# Patient Record
Sex: MALE | Race: WHITE | Employment: UNEMPLOYED | URBAN - METROPOLITAN AREA
[De-identification: names, ages, dates, MRNs, and addresses within clinical notes are randomized per-mention and may not be internally consistent; named-entity substitution may affect disease eponyms.]

---
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
## 2019-01-16 ENCOUNTER — OFFICE VISIT - HEALTHEAST (OUTPATIENT)
Dept: FAMILY MEDICINE | Facility: CLINIC | Age: 19
End: 2019-01-16

## 2019-01-16 DIAGNOSIS — R11.10 VOMITING AND DIARRHEA: ICD-10-CM

## 2019-01-16 DIAGNOSIS — R19.7 VOMITING AND DIARRHEA: ICD-10-CM

## 2019-07-09 ENCOUNTER — COMMUNICATION - HEALTHEAST (OUTPATIENT)
Dept: SCHEDULING | Facility: CLINIC | Age: 19
End: 2019-07-09

## 2019-07-12 ENCOUNTER — OFFICE VISIT - HEALTHEAST (OUTPATIENT)
Dept: FAMILY MEDICINE | Facility: CLINIC | Age: 19
End: 2019-07-12

## 2019-07-12 DIAGNOSIS — R07.89 ATYPICAL CHEST PAIN: ICD-10-CM

## 2019-07-12 LAB
ATRIAL RATE - MUSE: 75 BPM
DIASTOLIC BLOOD PRESSURE - MUSE: NORMAL MMHG
INTERPRETATION ECG - MUSE: NORMAL
P AXIS - MUSE: 62 DEGREES
PR INTERVAL - MUSE: 156 MS
QRS DURATION - MUSE: 114 MS
QT - MUSE: 398 MS
QTC - MUSE: 444 MS
R AXIS - MUSE: 94 DEGREES
SYSTOLIC BLOOD PRESSURE - MUSE: NORMAL MMHG
T AXIS - MUSE: 50 DEGREES
VENTRICULAR RATE- MUSE: 75 BPM

## 2020-08-09 ENCOUNTER — RECORDS - HEALTHEAST (OUTPATIENT)
Dept: ADMINISTRATIVE | Facility: OTHER | Age: 20
End: 2020-08-09

## 2020-08-24 ENCOUNTER — OFFICE VISIT - HEALTHEAST (OUTPATIENT)
Dept: FAMILY MEDICINE | Facility: CLINIC | Age: 20
End: 2020-08-24

## 2020-08-24 DIAGNOSIS — Z87.820 H/O MULTIPLE CONCUSSIONS: ICD-10-CM

## 2020-08-24 DIAGNOSIS — R45.4 EXCESSIVE ANGER: ICD-10-CM

## 2020-08-24 DIAGNOSIS — Z00.00 HEALTHCARE MAINTENANCE: ICD-10-CM

## 2020-08-24 DIAGNOSIS — F41.8 DEPRESSION WITH ANXIETY: ICD-10-CM

## 2020-08-24 LAB
ALBUMIN SERPL-MCNC: 4.7 G/DL (ref 3.5–5)
ALP SERPL-CCNC: 84 U/L (ref 45–120)
ALT SERPL W P-5'-P-CCNC: 24 U/L (ref 0–45)
ANION GAP SERPL CALCULATED.3IONS-SCNC: 13 MMOL/L (ref 5–18)
AST SERPL W P-5'-P-CCNC: 16 U/L (ref 0–40)
BASOPHILS # BLD AUTO: 0.1 THOU/UL (ref 0–0.2)
BASOPHILS NFR BLD AUTO: 1 % (ref 0–2)
BILIRUB SERPL-MCNC: 0.3 MG/DL (ref 0–1)
BUN SERPL-MCNC: 14 MG/DL (ref 8–22)
CALCIUM SERPL-MCNC: 10.6 MG/DL (ref 8.5–10.5)
CHLORIDE BLD-SCNC: 105 MMOL/L (ref 98–107)
CO2 SERPL-SCNC: 22 MMOL/L (ref 22–31)
CREAT SERPL-MCNC: 0.91 MG/DL (ref 0.7–1.3)
EOSINOPHIL # BLD AUTO: 0.2 THOU/UL (ref 0–0.4)
EOSINOPHIL NFR BLD AUTO: 3 % (ref 0–6)
ERYTHROCYTE [DISTWIDTH] IN BLOOD BY AUTOMATED COUNT: 11.4 % (ref 11–14.5)
GFR SERPL CREATININE-BSD FRML MDRD: >60 ML/MIN/1.73M2
GLUCOSE BLD-MCNC: 88 MG/DL (ref 70–125)
HCT VFR BLD AUTO: 51 % (ref 40–54)
HGB BLD-MCNC: 17.1 G/DL (ref 14–18)
HIV 1+2 AB+HIV1 P24 AG SERPL QL IA: NEGATIVE
LYMPHOCYTES # BLD AUTO: 1.9 THOU/UL (ref 0.8–4.4)
LYMPHOCYTES NFR BLD AUTO: 24 % (ref 20–40)
MCH RBC QN AUTO: 31.1 PG (ref 27–34)
MCHC RBC AUTO-ENTMCNC: 33.6 G/DL (ref 32–36)
MCV RBC AUTO: 93 FL (ref 80–100)
MONOCYTES # BLD AUTO: 0.7 THOU/UL (ref 0–0.9)
MONOCYTES NFR BLD AUTO: 9 % (ref 2–10)
NEUTROPHILS # BLD AUTO: 4.9 THOU/UL (ref 2–7.7)
NEUTROPHILS NFR BLD AUTO: 64 % (ref 50–70)
PLATELET # BLD AUTO: 232 THOU/UL (ref 140–440)
PMV BLD AUTO: 8.6 FL (ref 7–10)
POTASSIUM BLD-SCNC: 4.8 MMOL/L (ref 3.5–5)
PROT SERPL-MCNC: 8.1 G/DL (ref 6–8)
RBC # BLD AUTO: 5.52 MILL/UL (ref 4.4–6.2)
SODIUM SERPL-SCNC: 140 MMOL/L (ref 136–145)
TSH SERPL DL<=0.005 MIU/L-ACNC: 1.25 UIU/ML (ref 0.3–5)
WBC: 7.7 THOU/UL (ref 4–11)

## 2020-08-24 ASSESSMENT — ANXIETY QUESTIONNAIRES
6. BECOMING EASILY ANNOYED OR IRRITABLE: NEARLY EVERY DAY
GAD7 TOTAL SCORE: 13
7. FEELING AFRAID AS IF SOMETHING AWFUL MIGHT HAPPEN: MORE THAN HALF THE DAYS
5. BEING SO RESTLESS THAT IT IS HARD TO SIT STILL: MORE THAN HALF THE DAYS
IF YOU CHECKED OFF ANY PROBLEMS ON THIS QUESTIONNAIRE, HOW DIFFICULT HAVE THESE PROBLEMS MADE IT FOR YOU TO DO YOUR WORK, TAKE CARE OF THINGS AT HOME, OR GET ALONG WITH OTHER PEOPLE: VERY DIFFICULT
3. WORRYING TOO MUCH ABOUT DIFFERENT THINGS: MORE THAN HALF THE DAYS
1. FEELING NERVOUS, ANXIOUS, OR ON EDGE: SEVERAL DAYS
2. NOT BEING ABLE TO STOP OR CONTROL WORRYING: SEVERAL DAYS
4. TROUBLE RELAXING: MORE THAN HALF THE DAYS

## 2020-08-24 ASSESSMENT — MIFFLIN-ST. JEOR: SCORE: 2015.74

## 2020-08-24 ASSESSMENT — PATIENT HEALTH QUESTIONNAIRE - PHQ9: SUM OF ALL RESPONSES TO PHQ QUESTIONS 1-9: 10

## 2020-08-25 ENCOUNTER — COMMUNICATION - HEALTHEAST (OUTPATIENT)
Dept: FAMILY MEDICINE | Facility: CLINIC | Age: 20
End: 2020-08-25

## 2020-08-26 ENCOUNTER — RECORDS - HEALTHEAST (OUTPATIENT)
Dept: ADMINISTRATIVE | Facility: OTHER | Age: 20
End: 2020-08-26

## 2020-08-26 ENCOUNTER — COMMUNICATION - HEALTHEAST (OUTPATIENT)
Dept: FAMILY MEDICINE | Facility: CLINIC | Age: 20
End: 2020-08-26

## 2020-10-16 ENCOUNTER — COMMUNICATION - HEALTHEAST (OUTPATIENT)
Dept: FAMILY MEDICINE | Facility: CLINIC | Age: 20
End: 2020-10-16

## 2020-10-16 DIAGNOSIS — F41.8 DEPRESSION WITH ANXIETY: ICD-10-CM

## 2021-01-12 ENCOUNTER — COMMUNICATION - HEALTHEAST (OUTPATIENT)
Dept: FAMILY MEDICINE | Facility: CLINIC | Age: 21
End: 2021-01-12

## 2021-01-12 DIAGNOSIS — F41.8 DEPRESSION WITH ANXIETY: ICD-10-CM

## 2021-02-09 ENCOUNTER — VIRTUAL VISIT (OUTPATIENT)
Dept: FAMILY MEDICINE | Facility: CLINIC | Age: 21
End: 2021-02-09
Payer: COMMERCIAL

## 2021-02-09 ENCOUNTER — MYC MEDICAL ADVICE (OUTPATIENT)
Dept: FAMILY MEDICINE | Facility: CLINIC | Age: 21
End: 2021-02-09

## 2021-02-09 DIAGNOSIS — F41.9 ANXIETY: Primary | ICD-10-CM

## 2021-02-09 PROCEDURE — 99203 OFFICE O/P NEW LOW 30 MIN: CPT | Mod: 95 | Performed by: FAMILY MEDICINE

## 2021-02-09 RX ORDER — NICOTINE 21 MG/24HR
PATCH, TRANSDERMAL 24 HOURS TRANSDERMAL
COMMUNITY
Start: 2020-07-27

## 2021-02-09 RX ORDER — ESCITALOPRAM OXALATE 10 MG/1
10 TABLET ORAL DAILY
Qty: 90 TABLET | Refills: 3 | Status: SHIPPED | OUTPATIENT
Start: 2021-02-09

## 2021-02-09 RX ORDER — ESCITALOPRAM OXALATE 10 MG/1
TABLET ORAL
COMMUNITY
Start: 2021-01-12

## 2021-02-09 NOTE — PROGRESS NOTES
"Son is a 20 year old who is being evaluated via a billable video visit.      How would you like to obtain your AVS? MyChart  If the video visit is dropped, the invitation should be resent by: Text to cell phone: [phone]  Will anyone else be joining your video visit? No      Video Start Time: 1:28 PM    -------------------------    Assessment/Plan:    Son Tariq is a 20 year old male presenting for:    Anxiety  Lexapro sent to the pharmacy.  He is tolerating this well.  Discussed weaning process if he would like.  He can certainly MyChart message me as well he would like to discuss further.  Otherwise, we will plan on seeing him for a physical in 1 year.  - escitalopram (LEXAPRO) 10 MG tablet  Dispense: 90 tablet; Refill: 3        There are no discontinued medications.        Chief Complaint:  Recheck Medication          Subjective:   Son Tariq is a pleasant 20 year old male being evaluated via video visit today for the following concern/s:     Anxiety: Patient has a past medical history significant for anxiety.  Approximately 1 year ago he saw one of my partners and discussed this with them.  He was started on Lexapro and given a referral to see a therapist.  He has been seeing a therapist initially weekly and now down to monthly.  He feels as though he is doing very well.  He thinks the Lexapro is helping as well.  He states that it makes him \"feel like myself.\"    He denies any side effects.  He is hopeful to get a refill.  He is hopeful to wean off at some point but thinks he will continue using it for now.    He works for the [employer] and enjoys his job.      12 point review of systems completed and negative except for what has been described above    History   Smoking Status     Not on file   Smokeless Tobacco     Not on file         Current Outpatient Medications:      escitalopram (LEXAPRO) 10 MG tablet, TAKE 1 TABLET BY MOUTH EVERY DAY, Disp: , Rfl:      escitalopram (LEXAPRO) 10 MG tablet, Take 1 " tablet (10 mg) by mouth daily, Disp: 90 tablet, Rfl: 3     nicotine (NICODERM CQ) 14 MG/24HR 24 hr patch, APPLY 1 PATCH DAILY FOR 6 WEEKS, THEN DECREASE TO 7 MG PATCH DAILY (OTHER SCRIPT), Disp: , Rfl:      nicotine (NICODERM CQ) 7 MG/24HR 24 hr patch, APPLY 1 PATCH DAILY FOR 2 WEEKS. TO USE AFTER 6 WEEKS OF THE 14 MG PATCH., Disp: , Rfl:         Objective:  No vitals were done due to the nature of this visit  No flowsheet data found.            General: No acute distress  Psych: Appropriate affect  HEENT: moist mucous membranes  Pulmonary: Breathing comfortably, speaking in complete sentences  Extremities: warm and well perfused with no edema  Skin: warm and dry with no rash         This note has been dictated and transcribed using voice recognition software.   Any errors in transcription are unintentional and inherent to the software.      Video-Visit Details    Type of service:  Video Visit    Video End Time:1:45 PM    Originating Location (pt. Location): Home    Distant Location (provider location):  [facility]     Platform used for Video Visit: Ashley

## 2021-03-07 ENCOUNTER — HEALTH MAINTENANCE LETTER (OUTPATIENT)
Age: 21
End: 2021-03-07

## 2021-05-27 ASSESSMENT — PATIENT HEALTH QUESTIONNAIRE - PHQ9: SUM OF ALL RESPONSES TO PHQ QUESTIONS 1-9: 10

## 2021-05-28 ASSESSMENT — ANXIETY QUESTIONNAIRES: GAD7 TOTAL SCORE: 13

## 2021-05-30 VITALS — BODY MASS INDEX: 28.68 KG/M2 | WEIGHT: 211.5 LBS

## 2021-05-30 VITALS — HEIGHT: 72 IN | WEIGHT: 212.38 LBS | BODY MASS INDEX: 28.76 KG/M2

## 2021-05-30 NOTE — PATIENT INSTRUCTIONS - HE
I am not entirely sure what is causing your pain, but your EKG and chest xray are normal.      Ibuprofen/Naproxen Discharge Instructions:  You have been prescribed /Naproxen for pain control.  The maximum dose of naproxen is 1100 mg in a 24-hour period.  Take this twice daily for a week.    Take this medication with food to prevent stomach irritation.  With long-term use this medication can irritate the stomach causing pain and lead to development of a stomach ulcer.  If you notice stomach pain or vomiting of coffee-ground colored vomit or blood, please be seen by a healthcare provider.  Attempt to use this medication for the shortest time possible.      If no improvement in symptoms in a week, please see Dr. Mcgrath for a recheck.

## 2021-05-30 NOTE — PROGRESS NOTES
ASSESSMENT:   1. Atypical chest pain  XR Chest 2 Views    Electrocardiogram Perform and Read    naproxen (NAPROSYN) 500 MG tablet       PLAN:  18-year-old male with chest pain specifically on breathing, EKG is without ischemic changes or concerning pattern.  Chest x-ray is clear.  By well's criteria, he is low risk for pulmonary embolism and is PERC score negative.  Likely musculoskeletal pain.  We will treat with NSAIDs, will follow up with primary care if no improvement over the course of the next 1 week.    I discussed red flag symptoms, return precautions to clinic/ER and follow up care with patient/parent.  Expected clinical course, symptomatic cares advised. Questions answered. Patient/parent amenable with plan.    Patient Instructions:  Patient Instructions   I am not entirely sure what is causing your pain, but your EKG and chest xray are normal.      Ibuprofen/Naproxen Discharge Instructions:  You have been prescribed /Naproxen for pain control.  The maximum dose of naproxen is 1100 mg in a 24-hour period.  Take this twice daily for a week.    Take this medication with food to prevent stomach irritation.  With long-term use this medication can irritate the stomach causing pain and lead to development of a stomach ulcer.  If you notice stomach pain or vomiting of coffee-ground colored vomit or blood, please be seen by a healthcare provider.  Attempt to use this medication for the shortest time possible.      If no improvement in symptoms in a week, please see Dr. Mcgrath for a recheck.        SUBJECTIVE:   Son Tariq is a 18 y.o. male who presents today with left-sided chest pain and left shoulder pain for the past week.  He states that specifically hurts when taking a deep breath.  He does note that he has had an occasional cough that is productive.  He is a smoker.  He denies any hemoptysis, dyspnea on exertion, swelling of the lower extremities.  He has not traveled recently.  He has no personal or  "family history of bleeding or clotting disorder.  Does note that about 3 weeks ago, he had a \"head cold\" during which he had fevers up to 102, this resolved after several days, has had a cough since that time.  Pain is not increased with movement of the shoulder, however he does note that when he lies on that side he does get an increase in pain.      ROS:  Comprehensive 12 pt ROS completed, positives noted in HPI, otherwise negative.      Past Medical History:  Patient Active Problem List   Diagnosis     Concussion     Attention deficit hyperactivity disorder (ADHD)     Tympanic Membrane Perforation       Surgical History:  No past surgical history on file.        Family History:  No family history on file.    Reviewed; Non-contributory    Social History     Tobacco Use   Smoking Status Current Every Day Smoker     Types: Cigarettes   Smokeless Tobacco Never Used   Tobacco Comment    Uses the E-Cigarette       Current Medications:  Current Outpatient Medications on File Prior to Visit   Medication Sig Dispense Refill     methylphenidate HCl (RITALIN LA) 20 MG 24 hr capsule Take 1 capsule (20 mg total) by mouth every morning. 60 capsule 0     ondansetron (ZOFRAN ODT) 4 MG disintegrating tablet Take 1 tablet (4 mg total) by mouth every 8 (eight) hours as needed for nausea. 6 tablet 0     No current facility-administered medications on file prior to visit.        Allergies:   Allergies   Allergen Reactions     Amoxicillin Hives       OBJECTIVE:   Vitals:    07/12/19 1239   BP: 113/69   Patient Site: Right Arm   Patient Position: Sitting   Cuff Size: Adult Regular   Pulse: 82   Resp: 17   Temp: 97.8  F (36.6  C)   TempSrc: Oral   SpO2: 96%   Weight: 209 lb 2 oz (94.9 kg)     Physical exam reveals a pleasant 18 y.o. male.   General: Appears healthy, alert and cooperative. Non-toxic appearance.  Pulmonary/Chest: Chest is clear, no wheezing, rhonchi or rales. Symmetric air entry throughout both lung fields. Symmetrical " chest wall movement. Left anterior chest wall is tender to palpation.  Heart: regular rate and rhythm, no murmur, rub or gallop  Abdomen: soft, nontender. No masses or organomegaly  Neuro: Alert, oriented. Non-focal.  Skin: pink, warm, dry, and without lesions on limited skin exam. No rashes.  Psychiatric: Normal mood and affect.  Normal judgement and thought content. Normal behavior.       RADIOLOGY    Xr Chest 2 Views    Result Date: 7/12/2019  EXAM DATE:         07/12/2019 EXAM: X-RAY CHEST, 2 VIEWS, FRONTAL AND LATERAL LOCATION: [facility] DATE/TIME: 7/12/2019 1:15 PM INDICATION: cough, chest pain COMPARISON: None. FINDINGS: Negative chest.       LABORATORY STUDIES    Recent Results (from the past 24 hour(s))   Electrocardiogram Perform and Read   Result Value Ref Range    SYSTOLIC BLOOD PRESSURE  mmHg    DIASTOLIC BLOOD PRESSURE  mmHg    VENTRICULAR RATE 75 BPM    ATRIAL RATE 75 BPM    P-R INTERVAL 156 ms    QRS DURATION 114 ms    Q-T INTERVAL 398 ms    QTC CALCULATION (BEZET) 444 ms    P Axis 62 degrees    R AXIS 94 degrees    T AXIS 50 degrees    MUSE DIAGNOSIS       Normal sinus rhythm  Rightward axis  Incomplete right bundle branch block  Borderline ECG  No previous ECGs available  Confirmed by DERECK NOONAN, BEVERLY LOC:[facility] ([id number]) on 7/12/2019 1:48:24 PM             Sofi Urena CNP

## 2021-05-30 NOTE — TELEPHONE ENCOUNTER
17 y/o male calls about LEFT sided shoulder and chest pain, pain seems positional, only happens when he is lying down on shoulder. Pain does not get greater in frequency and intensity. Pain goes away if he lies on his RIGHT side.  He reports no chest pain now, no dizziness or lightheadedness, no known injury to shoulder, no local bruising or redness or open wound, afebrile.  He does report that it is painful to take a deep breath.     RN reviews protocols and advises him that the recommendation is he be seen in the ER now for evaluation, he will go now.    Farhana Barnes RN Triage Nurse/[facility]  07/09/2019  5:35PM    Reason for Disposition    Taking a deep breath makes pain worse    Protocols used: CHEST PAIN-A-AH

## 2021-06-02 VITALS — WEIGHT: 186.8 LBS | BODY MASS INDEX: 25.33 KG/M2

## 2021-06-03 VITALS — WEIGHT: 209.13 LBS | BODY MASS INDEX: 28.36 KG/M2

## 2021-06-04 VITALS
SYSTOLIC BLOOD PRESSURE: 112 MMHG | OXYGEN SATURATION: 98 % | WEIGHT: 209.6 LBS | BODY MASS INDEX: 27.78 KG/M2 | HEART RATE: 84 BPM | HEIGHT: 73 IN | DIASTOLIC BLOOD PRESSURE: 69 MMHG | RESPIRATION RATE: 16 BRPM

## 2021-06-09 NOTE — PROGRESS NOTES
[facility] Well Child Check    ASSESSMENT & PLAN  Son Tariq is a 16  y.o. 2  m.o. who has normal growth and normal development.    There are no diagnoses linked to this encounter.    Return to clinic in 1 year for a Well Child Check or sooner as needed    IMMUNIZATIONS/LABS  Immunizations were unable to be done given that there is no one to sign for them.  He will come back with his mother for his hepatitis A, Menactra, and HPV vaccine some point in the next few months.    REFERRALS  Dental:  Recommend routine dental care as appropriate.  Other:  No additional referrals were made at this time.    ANTICIPATORY GUIDANCE  I have reviewed age appropriate anticipatory guidance.    HEALTH HISTORY  Do you have any concerns that you'd like to discuss today?: No concerns       Refills needed? Yes    Do you have any forms that need to be filled out? No        Do you have any significant health concerns in your family history?: No  No family history on file.  Since your last visit, have there been any major changes in your family, such as a move, job change, separation, divorce, or death in the family?: No    Home  Who lives in your home?:  Mom, Dad, Brother Bay and Dog  Social History     Social History Narrative     Do you have any trouble with sleep?:  Yes: Back and Neck pain    Education  What school does your child attend?:  [employer]  What grade is your child in?:  11th  How does the patient perform in school (grades, behavior, attention, homework?: Good     Eating  Does patient eat regular meals including fruits and vegetables?:  yes  What is the patient drinking (cow's milk, water, soda, juice, sports drinks, energy drinks, etc)?: cow's milk- 2%, water, soda and sports drinks  Does patient have concerns about body or appearance?:  No    Activities  Does the patient have friends?:  yes  Does the patient get at least one hour of physical activity per day?:  yes  Does the patient have less than 2 hours of  screen time per day (aside from homework)?:  no  What does your child do for exercise?:  Biking  Does the patient have interest/participate in community activities/volunteers/school sports?:  yes    MENTAL HEALTH SCREENING  No Data Recorded  No Data Recorded    VISION/HEARING  Vision: Completed. See Results  Hearing:  Completed. See Results    No exam data present    TB Risk Assessment:  The patient and/or parent/guardian answer positive to:  patient and/or parent/guardian answer 'no' to all screening TB questions    Flouride Varnish Application Screening  Is child seen by dentist?     Yes    Patient Active Problem List   Diagnosis     Concussion     Attention-deficit Hyperactivity Disorder     Tympanic Membrane Perforation       Drugs  Does the patient use tobacco/alcohol/drugs?:  no    Safety  Does the patient have any safety concerns (peer or home)?:  no  Does the patient use safety belts, helmets and other safety equipment?:  no    Sex  Is the patient sexually active?:  no    MEASUREMENTS  Height:  6' (1.829 m)  Weight: 212 lb 6 oz (96.3 kg)  BMI: Body mass index is 28.8 kg/(m^2).  Blood Pressure: 100/62  Blood pressure percentiles are 2 % systolic and 25 % diastolic based on NHBPEP's 4th Report. Blood pressure percentile targets: 90: 136/85, 95: 139/89, 99 + 5 mmH/102.    PHYSICAL EXAM      General: Awake, Alert and Cooperative   Head: Normocephalic and Atraumatic   Eyes: PERRL and EOMI   ENT: Normal pearly TMs bilaterally and Oropharynx clear   Neck: Supple and Thyroid without enlargement or nodules   Chest: Chest wall normal   Lungs: Clear to auscultation bilaterally   Heart:: Regular rate and rhythm and no murmurs   Abdomen: Soft, nontender, nondistended and no hepatosplenomegaly   : Deferred by patient   Spine: Spine straight without curvature noted    Musculoskeletal: Moving all extremities, Normal tone and Full range of motion of the extremities   Neuro: Alert and oriented times 3, Normal tone in  upper and lower extremities and Grossly normal   Skin: No rashes or lesions noted

## 2021-06-10 NOTE — PROGRESS NOTES
Subjective:      Son Tariq is a 16 y.o. male here with his dad for evaluation of sore throat x 5 days. Last night pain much worse, didn't sleep well. But this morning now feels better. Pain with swallowing, still able to clear secretions. T-max 100.5 on Wednesday (3 days ago), no fever since, patient afebrile in clinic. Taking Sudafed, has had some congestion and cough, due to seasonal allergies. Some ibuprofen for comfort, seems to help. Appetite decreased, drinking well, no N/V/D, no stomach ache. Mom had URI illness. Strep and influenza at school    Objective:     Vitals:    04/15/17 1030   BP: 120/60   Pulse: 79   Resp: 20   Temp: 98.1  F (36.7  C)   SpO2: 98%       General: Alert, interactive, NAD, cooperative on exam  Eyes: PERRLA, EOMI, conjunctivae clear.   Ears: Right TM; pink and translucent. Left TM; pink and translucent   Nose:  Nasal mucosa erythema with clear mucus .  Turbinates mild swelling.   Mouth/Throat:  Tonsillar hypertrophy, 1+, erythematous, no exudate. Uvula midline. Posterior pharynx erythematous and inflamed  Mucus membranes pink and moist, free of lesions.  Neck: Supple, symmetrical, trachea midline, no adenopathy   Lungs: CTA bilaterally, good air movement throughout. No rales, rhonchi or wheezing  Heart:: Regular rate and rhythm, S1, S2 normal, no murmur, click, rub or gallop    Results for orders placed or performed in visit on 04/15/17   Rapid Strep A Screen-Throat   Result Value Ref Range    Rapid Strep A Antigen No Group A Strep detected No Group A Strep detected         Assessment/Plan:      1. Acute pharyngitis    2. Throat pain  - Rapid Strep A Screen-Throat  - Group A Strep, RNA Direct Detection, Throat    I reviewed exam and lab findings with patient and mom. Will contact parents in next 24-48 hours if strep confirmatory test positive, would prescribe antibiotics at that time. Patient able to clear secretions easily.  Advised Ibuprofen or Tylenol for discomfort. Warm salt  water gargles. Suck on hard candies/throat lozenges.  Additional supportive cares recommended for congestion due to allergies. Push fluids and get lots of rest. F/u with PCP if symptoms persist or worsen in any way.     -Patient instructions given.

## 2021-06-10 NOTE — PROGRESS NOTES
Assessment:      **  1. H/O multiple concussions  Ambulatory referral to Neurology   2. Excessive anger     3. Depression with anxiety  Ambulatory referral to Psychiatry    escitalopram oxalate (LEXAPRO) 10 MG tablet    Comprehensive Metabolic Panel    HM1(CBC and Differential)    Thyroid Cascade    HM1 (CBC with Diff)   4. Healthcare maintenance  HIV Antigen/Antibody Diagnostic [place]     Patient is a 19-year-old gentleman new to this clinic who presents today for depression and anxiety.  He has a history of ADHD not currently on medication and multiple concussion.  We discussed management options and we will start with Lexapro 10 mg daily.  Side effect and warning precautions discussed with the patient  Noting underlying ADHD, he will also be served better with a evaluation with psychiatry.  With concerns of excessive anger then multiple concussion, referral to neurology as above.  Baseline labs ordered for the patient.     Plan:      The diagnosis was discussed with the patient and evaluation and treatment plans outlined.  See orders for lab and imaging studies.  Further follow-up plans will be based on outcome of lab/imaging studies; see orders.  Follow up: Return in about 6 months (around 2/24/2021) for Annual physical.     Subjective:      Son Tariq is a  male who presents for evaluation of   Chief Complaint   Patient presents with     Referral     Pt Therapist recommended a referral to Neurology     Depression     Newly diagnosed with depression/ anxiety and it was recommended to start medication     Patient is a new patient to establish care.  He does have past medical history of ADHD and used to be on medication that he stopped using about 2 years ago.  Patient describes at least 3 concussions and grade 8.  He has been having anxiety and depression and is now followed with a counselor and feels that he has benefited the most.  Patient describes himself as always struggling to catch up on things and  unable to keep her schedule her prioritize his needs.  This was despite the fact that he was on Ritalin and Adderall.  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  His main concern is excessive anger.  He was also recommended by therapist to follow with neurology due to previous history of concussions.    The following portions of the patient's history were reviewed and updated as appropriate: allergies, current medications, past family history, past medical history, past social history, past surgical history and problem list.  Allergies   Allergen Reactions     Amoxicillin Hives       Current Outpatient Medications on File Prior to Visit   Medication Sig Dispense Refill     nicotine (NICODERM CQ) 14 mg/24 hr APPLY 1 PATCH DAILY FOR 6 WEEKS, THEN DECREASE TO 7 MG PATCH DAILY (OTHER SCRIPT)       nicotine (NICODERM CQ) 7 mg/24 hr APPLY 1 PATCH DAILY FOR 2 WEEKS. TO USE AFTER 6 WEEKS OF THE 14 MG PATCH.       [DISCONTINUED] methylphenidate HCl (RITALIN LA) 20 MG 24 hr capsule Take 1 capsule (20 mg total) by mouth every morning. 60 capsule 0     [DISCONTINUED] naproxen (NAPROSYN) 500 MG tablet Take 1 tablet (500 mg total) by mouth 2 (two) times a day with meals. 30 tablet 0     [DISCONTINUED] ondansetron (ZOFRAN ODT) 4 MG disintegrating tablet Take 1 tablet (4 mg total) by mouth every 8 (eight) hours as needed for nausea. 6 tablet 0     No current facility-administered medications on file prior to visit.        Patient Active Problem List   Diagnosis     Attention deficit hyperactivity disorder (ADHD)     Tympanic Membrane Perforation     Hx of concussion     Depression with anxiety       History reviewed. No pertinent past medical history.    History reviewed. No pertinent surgical history.    Family History   Problem Relation Age of Onset     Gout Father      No Medical Problems Brother        Social History     Socioeconomic History     Marital  "status: Single     Spouse name: None     Number of children: None     Years of education: None     Highest education level: None   Occupational History     None   Social Needs     Financial resource strain: None     Food insecurity     Worry: None     Inability: None     Transportation needs     Medical: None     Non-medical: None   Tobacco Use     Smoking status: Current Every Day Smoker     Types: Cigarettes     Smokeless tobacco: Never Used     Tobacco comment: Uses the E-Cigarette   Substance and Sexual Activity     Alcohol use: Yes     Drug use: Never     Sexual activity: Yes     Partners: Female     Birth control/protection: None   Lifestyle     Physical activity     Days per week: None     Minutes per session: None     Stress: None   Relationships     Social connections     Talks on phone: None     Gets together: None     Attends Taoist service: None     Active member of club or organization: None     Attends meetings of clubs or organizations: None     Relationship status: None     Intimate partner violence     Fear of current or ex partner: None     Emotionally abused: None     Physically abused: None     Forced sexual activity: None   Other Topics Concern     None   Social History Narrative    Lives with parents.     Going to apprentice program [employer].        Diallo Chou MD  8/24/2020           Review of Systems  A 12 point comprehensive review of systems was negative except as noted.       Objective:   /69 (Patient Site: Left Arm, Patient Position: Sitting, Cuff Size: Adult Large)   Pulse 84   Resp 16   Ht 6' 1.39\" (1.864 m)   Wt 209 lb 9.6 oz (95.1 kg)   SpO2 98%   BMI 27.36 kg/m    GENERAL: Healthy, alert and no distress  EYES: Eyes grossly normal to inspection. No discharge or erythema, or obvious scleral/conjunctival abnormalities.  NECK: No asymmetry, masses or scars  RESP: lungs clear to auscultation - no rales, rhonchi or wheezes  CV: regular rates and rhythm  MS: " No gross musculoskeletal defects noted.  Normal range of motion. No visible edema.  SKIN: Visible skin clear. No significant rash, abnormal pigmentation or lesions.  NEURO: Cranial nerves grossly intact. Mentation and speech appropriate for age.  PSYCH: Mentation appears normal, affect normal/bright, judgement and insight intact, normal speech and appearance well-groomed  Little interest or pleasure in doing things: Several days  Feeling down, depressed, or hopeless: Several days  Trouble falling or staying asleep, or sleeping too much: Nearly every day  Feeling tired or having little energy: Nearly every day  Poor appetite or overeating: Several days  Feeling bad about yourself - or that you are a failure or have let yourself or your family down: Several days  Trouble concentrating on things, such as reading the newspaper or watching television: Not at all  Moving or speaking so slowly that other people could have noticed. Or the opposite - being so fidgety or restless that you have been moving around a lot more than usual: Not at all  Thoughts that you would be better off dead, or of hurting yourself in some way: Not at all  PHQ-9 Total Score: 10  If you checked off any problems, how difficult have these problems made it for you to do your work, take care of things at home, or get along with other people?: Somewhat difficult  How difficult did these problems make it for you to do your work, take care of things at home or get along with other people? : Very difficult (2020 12:00 PM)  Feeling nervous, anxious, or on edge: 1 (2020 12:00 PM)  Not being able to stop or control worryin (2020 12:00 PM)  Worrying too much about different things: 2 (2020 12:00 PM)  Trouble relaxin (2020 12:00 PM)  Being so restless that it's hard to sit still: 2 (2020 12:00 PM)  Becoming easily annoyed or irritable: 3 (2020 12:00 PM)  Feeling afraid as if something awful might happen: 2 (2020  12:00 PM)  MARTELL 7 Total Score: 13 (8/24/2020 12:00 PM)  How difficult did these problems make it for you to do your work, take care of things at home or get along with other people? : Very difficult (8/24/2020 12:00 PM)

## 2021-06-10 NOTE — TELEPHONE ENCOUNTER
Reason contacted:  results  Information relayed:  Informed patient of message below. Patient states understanding.   Additional questions:  No  Further follow-up needed:  No  Okay to leave a detailed message:  No      Letter mailed

## 2021-06-10 NOTE — TELEPHONE ENCOUNTER
----- Message from Diallo Chou MD sent at 8/25/2020  6:03 PM CDT -----  Please inform patient that his blood test results are all normal/stable.  A letter is being mailed to him.  Diallo Chou MD  8/25/2020

## 2021-06-14 NOTE — TELEPHONE ENCOUNTER
I sent a 1 month refill of this patient's medication.  They will need to be seen prior to further refills being prescribed.     They should call [phone] to get scheduled at the [facility].       Thanks!    BB

## 2021-06-14 NOTE — TELEPHONE ENCOUNTER
RN cannot approve Refill Request    RN can NOT refill this medication med is not covered by policy/route to provider. Last office visit: Visit date not found Last Physical: 3/7/2017 Last MTM visit: Visit date not found Last visit same specialty: 8/24/2020 Diallo Chou MD.  Next visit within 3 mo: Visit date not found  Next physical within 3 mo: Visit date not found      Madelin Shearer, [facility] Triage/Med Refill 1/12/2021    Requested Prescriptions   Pending Prescriptions Disp Refills     escitalopram oxalate (LEXAPRO) 10 MG tablet [Pharmacy Med Name: ESCITALOPRAM 10 MG TABLET] 90 tablet 0     Sig: TAKE 1 TABLET BY MOUTH EVERY DAY       [facility] Refill Protocol  Failed - 1/12/2021  2:03 AM        Failed - Age 21 and younger route to prescribing provider     Last office visit with prescriber/PCP: Visit date not found OR same dept: 8/24/2020 Diallo Chou MD OR same specialty: 8/24/2020 Diallo Chou MD  Last physical: 3/7/2017 Last MTM visit: Visit date not found   Next visit within 3 mo: Visit date not found  Next physical within 3 mo: Visit date not found  Prescriber OR PCP: Moriah Mcgrath MD  Last diagnosis associated with med order: 1. Depression with anxiety  - escitalopram oxalate (LEXAPRO) 10 MG tablet [Pharmacy Med Name: ESCITALOPRAM 10 MG TABLET]; TAKE 1 TABLET BY MOUTH EVERY DAY  Dispense: 90 tablet; Refill: 0    If protocol passes may refill for 12 months if within 3 months of last provider visit (or a total of 15 months).             Passed - PCP or prescribing provider visit in last year     Last office visit with prescriber/PCP: Visit date not found OR same dept: 8/24/2020 Diallo Chou MD OR same specialty: 8/24/2020 Diallo Chou MD  Last physical: 3/7/2017 Last MTM visit: Visit date not found   Next visit within 3 mo: Visit date not found  Next physical within 3 mo: Visit date not found  Prescriber OR PCP: Moriah Mcgrath MD  Last diagnosis associated  with med order: 1. Depression with anxiety  - escitalopram oxalate (LEXAPRO) 10 MG tablet [Pharmacy Med Name: ESCITALOPRAM 10 MG TABLET]; TAKE 1 TABLET BY MOUTH EVERY DAY  Dispense: 90 tablet; Refill: 0    If protocol passes may refill for 12 months if within 3 months of last provider visit (or a total of 15 months).

## 2021-06-16 PROBLEM — F41.8 DEPRESSION WITH ANXIETY: Status: ACTIVE | Noted: 2020-08-24

## 2021-06-16 PROBLEM — Z87.820 HX OF CONCUSSION: Status: ACTIVE | Noted: 2020-08-24

## 2021-06-18 NOTE — PATIENT INSTRUCTIONS - HE
Patient Instructions by Diallo Chou MD at 8/24/2020 11:00 AM     Author: Diallo Chou MD Service: -- Author Type: Physician    Filed: 8/24/2020 11:42 AM Encounter Date: 8/24/2020 Status: Signed    : Diallo Chou MD (Physician)       Patient Education     Depression  Depression is one of the most common mental health problems today. It is not just a state of unhappiness or sadness. It is a true disease. The cause seems to be related to a decrease in chemicals that transmit signals in the brain. Having a family history of depression, alcoholism, or suicide increases the risk. Chronic illness, chronic pain, migraine headaches, and high emotional stress also increase the risk.  Depression is something we tend to recognize in others, but may have a hard time seeing in ourselves. It can show in many physical and emotional ways:    Loss of appetite    Overeating    Not being able to sleep    Sleeping too much    Tiredness not related to physical exertion    Restlessness or irritability    Slowness of movement or speech    Feeling depressed or withdrawn    Loss of interest in things you once enjoyed    Trouble concentrating, poor memory, trouble making decisions    Thoughts of harming or killing oneself, or thoughts that life is not worth living    Low self-esteem  The treatment for depression may include both medicine and psychotherapy. Antidepressants can reduce suffering and can improve the ability to function during the depressed period. Therapy can offer emotional support and help you understand emotional factors that may be causing the depression.  Home care    Ongoing care and support help people manage this disease. Find a healthcare provider and therapist who meet your needs. Seek help when you feel like you may be getting ill.    Be kind to yourself. Make it a point to do things that you enjoy (gardening, walking in nature, going to a movie). Reward yourself for small successes.    Take  care of your physical body. Eat a balanced diet (low in saturated fat and high in fruits and vegetables). Exercise at least 3 times a week for 30 minutes. Even mild-moderate exercise (like brisk walking) can make you feel better.    Don't drink alcohol, which can make depression worse.    Take medicine as prescribed.    Tell each of your healthcare providers about all of the prescription and over-the-counter medicines, vitamins, and supplements you take. Certain supplements interact with medicines and can result in dangerous side effects. Ask your pharmacist when you have questions about medicine interactions.    Talk with your family and trusted friends about your feelings and thoughts. Ask them to help you recognize behavior changes early so you can get help and, if needed, medicine can be adjusted.  Follow-up care  Follow up with your healthcare provider, or as advised.  Call 911  Call 911 if you:    Have suicidal thoughts, a suicide plan, and the means to carry out the plan; or serious thoughts of hurting someone else     Have trouble breathing    Are very confused    Feel very drowsy or have trouble awakening    Faint or lose consciousness    Have new chest pain that becomes more severe, lasts longer, or spreads into your shoulder, arm, neck, jaw, or back  When to seek medical advice  Call your healthcare provider right away if any of these happen:    Feeling extreme depression, fear, anxiety, or anger toward yourself or others    Feeling out of control    Feeling that you may try to harm yourself or another    Hearing voices that others do not hear    Seeing things that others do not see    Cant sleep or eat for 3 days in a row    Friends or family express concern over your behavior and ask you to seek help  Date Last Reviewed: 10/1/2017    4872-4425 [employer]. [address] [place] [address], [place] [address]. All rights reserved. This information is not intended as a substitute for professional  medical care. Always follow your healthcare professional's instructions.         Patient Education     Anxiety and Traumatic Brain Injury  A traumatic brain injury (TBI) is a brain injury that can change the way you think, act, and feel. A TBI could be caused by a blow to your head, falls, fights, sports, and car accidents.  Anxiety is fear and worry. Dealing with a TBI is stressful, so its not surprising that anxiety is a common symptom of a TBI. But when fear and worry become so strong that they get in the way of your ability to live your life, you could have an anxiety disorder.  Spotting an anxiety disorder with a TBI is important. This is because an anxiety disorder can make it hard to do the things you need to do to get better. An anxiety disorder may also increase your risk for substance abuse and depression.  Symptoms of anxiety disorder  Like a TBI, an anxiety disorder can change the way you think, act, and feel. It can also cause physical symptoms. In extreme cases, it can even cause a seizure. Here are some common symptoms to watch for:    Extreme fear and worry that doesn't let up    Shortness of breath    Racing heartbeat    Trouble sleeping    Restlessness    Trembling    Dizziness    Nausea    Inability to think clearly    Panic attacks  Types of anxiety disorders  If you have common symptoms of anxiety that get in the way of your ability to live your life, it is called generalized anxiety disorder.  There are also these specific kinds of anxiety disorders:    Panic disorder causes fear that is more like terror. You may live in fear of having a panic attack. People with panic disorder sometimes become afraid to leave the house.    Phobias are intense fears of certain things or situations. If you have this type of anxiety, you may fear an activity like flying or you may be afraid of public places.    Obsessive compulsive disorder (OCD) causes you to have uncontrolled thoughts and feelings. People with  OCD repeat behaviors, like cleaning or washing, over and over again.    Post-traumatic stress disorder (PTSD) is a type of anxiety in which people relive a traumatic event in flashbacks and nightmares. About 1 in 4 people with a TBI have PTSD. This is common among veterans wounded during combat.   What to do for an anxiety disorder  Let your healthcare provider know about your anxiety symptoms. You are not alone. Your healthcare provider is aware of the risks of anxiety disorder and can help you. A mental health professional can treat an anxiety disorder with a type of counseling called cognitive behavioral therapy (CBT).  During CBT, you learn to figure out the sources of anxiety and manage your symptoms. CBT teaches you to change the thoughts that lead to anxiety. It also teaches you to deal with symptoms in healthy ways. Relaxation techniques and deep-breathing exercises may be part of the treatment. Antianxiety medicines are sometimes used along with CBT.  You can also take steps on your own to cope with anxiety:    Share your fears and worries with others.    Stay active and spend time with friends and loved ones.    Don't use alcohol or drugs to relieve anxiety.    Dont smoke or drink too much coffee.    Eat a healthy diet, get regular exercise, and keep regular hours for sleep.    Reduce stress by taking part in activities you enjoy.  TBI symptoms get better with time. Everybodys brain heals at a different pace. Be patient and give yourself the time you need. Dont let anxiety get in the way of your recovery. You dont need to suffer since treatment is available for anxiety and TBI.  Date Last Reviewed: 7/1/2016 2000-2019 [employer]. [address] [place] [address], [place] [address]. All rights reserved. This information is not intended as a substitute for professional medical care. Always follow your healthcare professional's instructions.

## 2021-06-18 NOTE — LETTER
Letter by Bill Moffett MD at      Author: Bill Moffett MD Service: -- Author Type: --    Filed:  Encounter Date: 1/16/2019 Status: (Other)       January 16, 2019     Patient: Son Tariq   YOB: 2000   Date of Visit: 1/16/2019       To Whom It May Concern:    It is my medical opinion that Son Tariq may return to work on 1/17/19.    If you have any questions or concerns, please don't hesitate to call.    Sincerely,        Electronically signed by Bill Moffett MD

## 2021-06-20 NOTE — LETTER
Letter by Diallo Chou MD at      Author: Diallo Chou MD Service: -- Author Type: --    Filed:  Encounter Date: 8/25/2020 Status: (Other)         Son Tariq  [address] [facility] [address]             August 25, 2020         Dear Mr. Tariq,    Below are the results from your recent visit:    Resulted Orders   Comprehensive Metabolic Panel   Result Value Ref Range    Sodium 140 136 - 145 mmol/L    Potassium 4.8 3.5 - 5.0 mmol/L    Chloride 105 98 - 107 mmol/L    CO2 22 22 - 31 mmol/L    Anion Gap, Calculation 13 5 - 18 mmol/L    Glucose 88 70 - 125 mg/dL    BUN 14 8 - 22 mg/dL    Creatinine 0.91 0.70 - 1.30 mg/dL    GFR MDRD Af Amer >60 >60 mL/min/1.73m2    GFR MDRD Non Af Amer >60 >60 mL/min/1.73m2    Bilirubin, Total 0.3 0.0 - 1.0 mg/dL    Calcium 10.6 (H) 8.5 - 10.5 mg/dL    Protein, Total 8.1 (H) 6.0 - 8.0 g/dL    Albumin 4.7 3.5 - 5.0 g/dL    Alkaline Phosphatase 84 45 - 120 U/L    AST 16 0 - 40 U/L    ALT 24 0 - 45 U/L    Narrative    Fasting Glucose reference range is 70-99 mg/dL per  American Diabetes Association (ADA) guidelines.   Thyroid Cascade   Result Value Ref Range    TSH 1.25 0.30 - 5.00 uIU/mL   HIV Antigen/Antibody Diagnostic [place]   Result Value Ref Range    HIV Antigen / Antibody Negative Negative    Narrative    Method is Abbott HIV Ag/Ab for the detection of HIV p24 antigen, HIV-1 antibodies and HIV-2 antibodies.   HM1 (CBC with Diff)   Result Value Ref Range    WBC 7.7 4.0 - 11.0 thou/uL    RBC 5.52 4.40 - 6.20 mill/uL    Hemoglobin 17.1 14.0 - 18.0 g/dL    Hematocrit 51.0 40.0 - 54.0 %    MCV 93 80 - 100 fL    MCH 31.1 27.0 - 34.0 pg    MCHC 33.6 32.0 - 36.0 g/dL    RDW 11.4 11.0 - 14.5 %    Platelets 232 140 - 440 thou/uL    MPV 8.6 7.0 - 10.0 fL    Neutrophils % 64 50 - 70 %    Lymphocytes % 24 20 - 40 %    Monocytes % 9 2 - 10 %    Eosinophils % 3 0 - 6 %    Basophils % 1 0 - 2 %    Neutrophils Absolute 4.9 2.0 - 7.7 thou/uL    Lymphocytes Absolute 1.9 0.8 - 4.4  thou/uL    Monocytes Absolute 0.7 0.0 - 0.9 thou/uL    Eosinophils Absolute 0.2 0.0 - 0.4 thou/uL    Basophils Absolute 0.1 0.0 - 0.2 thou/uL       All the above test results are normal/negative.    Please call with questions or contact us using [employer].    Sincerely,        Electronically signed by Diallo Chou MD

## 2021-06-23 NOTE — PROGRESS NOTES
Chief Complaint   Patient presents with     during class today started feeling nauseated     tried tums to alleviate the discomfort.  unable to eat lunch, vomited and a short while later.  Dry heaves after that. Also had diarrhea. Unsure about fevers.  Feels weak and light headed.     Needs note for school today         HPI:    Patient is here for nausea, vomiting, and nonbloody watery diarrhea started today. Vomited 3 times, and having diarrhea 6 times. Still having nausea now. Also has generalized abdominal discomfort. No fever, chest pain, shortness of breath, recent travel, dietary changes.    ROS: Pertinent ROS noted in HPI.     Allergies   Allergen Reactions     Amoxicillin Hives       Patient Active Problem List   Diagnosis     Concussion     Attention-deficit Hyperactivity Disorder     Tympanic Membrane Perforation       No family history on file.    Social History     Socioeconomic History     Marital status: Single     Spouse name: Not on file     Number of children: Not on file     Years of education: Not on file     Highest education level: Not on file   Social Needs     Financial resource strain: Not on file     Food insecurity - worry: Not on file     Food insecurity - inability: Not on file     Transportation needs - medical: Not on file     Transportation needs - non-medical: Not on file   Occupational History     Not on file   Tobacco Use     Smoking status: Current Every Day Smoker     Types: Cigarettes     Smokeless tobacco: Never Used     Tobacco comment: Uses the E-Cigarette   Substance and Sexual Activity     Alcohol use: Not on file     Drug use: Not on file     Sexual activity: Not on file   Other Topics Concern     Not on file   Social History Narrative     Not on file     Objective:    Vitals:    01/16/19 1502   BP: 120/64   Pulse: (!) 114   Resp: 20   Temp: 97.9  F (36.6  C)   SpO2: 97%     Recheck pulse by me = 100    Gen:NAD  Oropharynx: moist mucus membranes  CV:RRR, normal S1S2, no M,  R, G  Pulm: CTAB, normal effort  Abd: normal inspection, normal bowel sounds, mild generalized tenderness to palpation without guarding nor rebound, no mass/HSM      Vomiting and diarrhea  -     ondansetron (ZOFRAN ODT) 4 MG disintegrating tablet; Take 1 tablet (4 mg total) by mouth every 8 (eight) hours as needed for nausea.      Likely viral gastroenteritis. Patient tolerated water in clinic. Advised home oral hydration, dietary modification as directed. F/u as directed.

## 2021-06-23 NOTE — PATIENT INSTRUCTIONS - HE
Advised fluids for the next few hours, then start bland diet (if tolerated) such as banana, rice, apple sauce, toast.      Follow up closely in the Emergency Department if your cannot keep liquids down or your symptoms worsen.

## 2021-07-03 NOTE — ADDENDUM NOTE
Addendum Note by Moriah Mcgrath MD at 5/17/2017 12:46 PM     Author: Moriah Mcgrath MD Service: -- Author Type: Physician    Filed: 5/17/2017 12:46 PM Encounter Date: 5/17/2017 Status: Signed    : Moriah Mcgrath MD (Physician)    Addended by: MORIAH MCGRATH on: 5/17/2017 12:46 PM        Modules accepted: Orders

## 2021-07-30 ENCOUNTER — TRANSFERRED RECORDS (OUTPATIENT)
Dept: HEALTH INFORMATION MANAGEMENT | Facility: CLINIC | Age: 21
End: 2021-07-30

## 2021-10-11 ENCOUNTER — HEALTH MAINTENANCE LETTER (OUTPATIENT)
Age: 21
End: 2021-10-11

## 2022-03-27 ENCOUNTER — HEALTH MAINTENANCE LETTER (OUTPATIENT)
Age: 22
End: 2022-03-27

## 2022-09-25 ENCOUNTER — HEALTH MAINTENANCE LETTER (OUTPATIENT)
Age: 22
End: 2022-09-25

## 2023-02-19 ENCOUNTER — NURSE TRIAGE (OUTPATIENT)
Dept: NURSING | Facility: CLINIC | Age: 23
End: 2023-02-19
Payer: COMMERCIAL

## 2023-02-19 NOTE — TELEPHONE ENCOUNTER
Is this a 2nd Level Triage? NO    Situation: Left Eye Blepharospasm    Background:   Pt reports this starting about 3 weeks ago. Pt reports a twitching of his left eye on and off.     Assessment:   Pt reports twitching of the left eye that is on and off, and happens quite a few times a day, but is not interfering with work or daily activities. Pt reports he has been taking a pre workout mixture, and started this around the same time as the twitching, but does not know if this could be causing it. Pt denies any other neurological symptoms. Pt denies any eye injury, redness, swelling, discharge, or pain.  Care advice given.     Protocol Recommended Disposition:   See PCP within 2 weeks.     Recommendation:   Per protocol, pt should see PCP within 2 weeks. Alternate disposition to see eye doctor. Pt needs an eye exam anyway, so he plans to schedule an appointment with the eye doctor. Pt was advised to:    CALL BACK IF:  * Eye pain, redness, swelling, or drainage occurs.  * You become worse    Pt verbalized understanding.    Juvenal Solis RN on 2/19/2023 at 4:52 PM      Reason for Disposition    Abnormal twitch, blinking, tic, or spasm of eyelid(s)    [1] Eyelid twitches are a chronic symptom (recurrent or ongoing) AND [2] present > 4 weeks    Additional Information    Negative: Difficult to awaken or acting confused (e.g., disoriented, slurred speech)    Negative: Sounds like a life-threatening emergency to the triager    Negative: Loss of speech or garbled speech    Negative: Difficult to awaken or acting confused (e.g., disoriented, slurred speech)    Negative: Sounds like a life-threatening emergency to the triager    Negative: Seizure suspected    Negative: Eye foreign body is suspected (feels like something is in the eye)    Negative: Eye pain    Negative: Eye pus or discharge    Negative: Injury to eye    Negative: Face or arm weakness    Negative: Eye redness    Negative: [1] SEVERE eyelid twitch or spasm  (constant or non-stop) AND lasts > 15 minutes    Negative: [1] Muscle twitch (spasm) also happens in face (e.g., cheek, jaw area) AND [2] lasts more than a few seconds    Negative: Patient sounds very sick or weak to the triager    Negative: [1] Muscle twitch (spasm) also happens in face (e.g., cheek, jaw area) AND [2] brief (a second or two; not present now)    Negative: MODERATE eyelid twitch or spasm (interferes with school or work)    Negative: [1] Eyelid twitches are a chronic symptom (recurrent or ongoing) AND [2] happen in both eyes at same time    Protocols used: MUSCLE JERKS - TICS - [id number], EYELID TWITCH OR SPASM-A-AH

## 2023-05-08 ENCOUNTER — HEALTH MAINTENANCE LETTER (OUTPATIENT)
Age: 23
End: 2023-05-08

## 2024-05-11 ENCOUNTER — HEALTH MAINTENANCE LETTER (OUTPATIENT)
Age: 24
End: 2024-05-11